# Patient Record
Sex: FEMALE | Race: WHITE | Employment: UNEMPLOYED
[De-identification: names, ages, dates, MRNs, and addresses within clinical notes are randomized per-mention and may not be internally consistent; named-entity substitution may affect disease eponyms.]

---

## 2017-02-24 ENCOUNTER — OFFICE VISIT (OUTPATIENT)
Dept: FAMILY MEDICINE CLINIC | Facility: CLINIC | Age: 57
End: 2017-02-24

## 2017-02-24 VITALS
SYSTOLIC BLOOD PRESSURE: 122 MMHG | RESPIRATION RATE: 20 BRPM | BODY MASS INDEX: 23.02 KG/M2 | HEART RATE: 73 BPM | WEIGHT: 160.8 LBS | TEMPERATURE: 98.1 F | OXYGEN SATURATION: 96 % | DIASTOLIC BLOOD PRESSURE: 74 MMHG

## 2017-02-24 DIAGNOSIS — I47.1 SVT (SUPRAVENTRICULAR TACHYCARDIA) (HCC): ICD-10-CM

## 2017-02-24 DIAGNOSIS — J01.40 ACUTE NON-RECURRENT PANSINUSITIS: Primary | ICD-10-CM

## 2017-02-24 DIAGNOSIS — K21.9 GASTROESOPHAGEAL REFLUX DISEASE WITHOUT ESOPHAGITIS: ICD-10-CM

## 2017-02-24 PROCEDURE — 3014F SCREEN MAMMO DOC REV: CPT | Performed by: NURSE PRACTITIONER

## 2017-02-24 PROCEDURE — G8484 FLU IMMUNIZE NO ADMIN: HCPCS | Performed by: NURSE PRACTITIONER

## 2017-02-24 PROCEDURE — G8420 CALC BMI NORM PARAMETERS: HCPCS | Performed by: NURSE PRACTITIONER

## 2017-02-24 PROCEDURE — 99214 OFFICE O/P EST MOD 30 MIN: CPT | Performed by: NURSE PRACTITIONER

## 2017-02-24 PROCEDURE — G8427 DOCREV CUR MEDS BY ELIG CLIN: HCPCS | Performed by: NURSE PRACTITIONER

## 2017-02-24 PROCEDURE — 3017F COLORECTAL CA SCREEN DOC REV: CPT | Performed by: NURSE PRACTITIONER

## 2017-02-24 PROCEDURE — 4004F PT TOBACCO SCREEN RCVD TLK: CPT | Performed by: NURSE PRACTITIONER

## 2017-02-24 RX ORDER — AMOXICILLIN 500 MG/1
500 CAPSULE ORAL 3 TIMES DAILY
Qty: 30 CAPSULE | Refills: 0 | Status: SHIPPED | OUTPATIENT
Start: 2017-02-24 | End: 2017-03-06

## 2017-02-24 RX ORDER — NAPROXEN 500 MG/1
TABLET ORAL
Qty: 90 TABLET | Refills: 1 | Status: SHIPPED | OUTPATIENT
Start: 2017-02-24 | End: 2017-04-26 | Stop reason: SDUPTHER

## 2017-02-24 RX ORDER — ESOMEPRAZOLE MAGNESIUM 40 MG/1
CAPSULE, DELAYED RELEASE ORAL
Qty: 90 CAPSULE | Refills: 1 | Status: SHIPPED | OUTPATIENT
Start: 2017-02-24 | End: 2017-10-20 | Stop reason: SDUPTHER

## 2017-03-04 ASSESSMENT — ENCOUNTER SYMPTOMS
NAUSEA: 0
COUGH: 0
SHORTNESS OF BREATH: 0
SORE THROAT: 0
EYE ITCHING: 0
ABDOMINAL DISTENTION: 0
BLOOD IN STOOL: 0
DIARRHEA: 0
EYE PAIN: 0
WATER BRASH: 1
CHEST TIGHTNESS: 0
CONSTIPATION: 0
SINUS PRESSURE: 1
COLOR CHANGE: 0
ABDOMINAL PAIN: 0
WHEEZING: 0
HEARTBURN: 1

## 2017-04-28 RX ORDER — NAPROXEN 500 MG/1
TABLET ORAL
Qty: 180 TABLET | Refills: 1 | Status: SHIPPED | OUTPATIENT
Start: 2017-04-28 | End: 2017-10-06 | Stop reason: SDUPTHER

## 2017-07-20 ENCOUNTER — OFFICE VISIT (OUTPATIENT)
Dept: FAMILY MEDICINE CLINIC | Age: 57
End: 2017-07-20
Payer: COMMERCIAL

## 2017-07-20 VITALS
DIASTOLIC BLOOD PRESSURE: 78 MMHG | SYSTOLIC BLOOD PRESSURE: 124 MMHG | HEART RATE: 71 BPM | OXYGEN SATURATION: 96 % | TEMPERATURE: 98 F | WEIGHT: 158 LBS | BODY MASS INDEX: 22.62 KG/M2 | RESPIRATION RATE: 16 BRPM

## 2017-07-20 DIAGNOSIS — I47.1 SUPRAVENTRICULAR TACHYCARDIA (HCC): ICD-10-CM

## 2017-07-20 DIAGNOSIS — D50.9 IRON DEFICIENCY ANEMIA, UNSPECIFIED IRON DEFICIENCY ANEMIA TYPE: Primary | ICD-10-CM

## 2017-07-20 PROCEDURE — G8420 CALC BMI NORM PARAMETERS: HCPCS | Performed by: NURSE PRACTITIONER

## 2017-07-20 PROCEDURE — 4004F PT TOBACCO SCREEN RCVD TLK: CPT | Performed by: NURSE PRACTITIONER

## 2017-07-20 PROCEDURE — 3017F COLORECTAL CA SCREEN DOC REV: CPT | Performed by: NURSE PRACTITIONER

## 2017-07-20 PROCEDURE — 99213 OFFICE O/P EST LOW 20 MIN: CPT | Performed by: NURSE PRACTITIONER

## 2017-07-20 PROCEDURE — G8427 DOCREV CUR MEDS BY ELIG CLIN: HCPCS | Performed by: NURSE PRACTITIONER

## 2017-07-20 PROCEDURE — 3014F SCREEN MAMMO DOC REV: CPT | Performed by: NURSE PRACTITIONER

## 2017-07-20 RX ORDER — LANOLIN ALCOHOL/MO/W.PET/CERES
325 CREAM (GRAM) TOPICAL 2 TIMES DAILY
Qty: 180 TABLET | Refills: 1 | Status: SHIPPED | OUTPATIENT
Start: 2017-07-20

## 2017-07-21 ENCOUNTER — TELEPHONE (OUTPATIENT)
Dept: FAMILY MEDICINE CLINIC | Age: 57
End: 2017-07-21

## 2017-07-29 ASSESSMENT — ENCOUNTER SYMPTOMS
CHEST TIGHTNESS: 0
BLOOD IN STOOL: 0
WHEEZING: 0
SORE THROAT: 0
COLOR CHANGE: 0
SINUS PRESSURE: 0
DIARRHEA: 0
EYE PAIN: 0
EYE ITCHING: 0
CONSTIPATION: 0
ABDOMINAL DISTENTION: 0

## 2017-10-20 ENCOUNTER — OFFICE VISIT (OUTPATIENT)
Dept: FAMILY MEDICINE CLINIC | Age: 57
End: 2017-10-20
Payer: COMMERCIAL

## 2017-10-20 VITALS
HEART RATE: 68 BPM | OXYGEN SATURATION: 96 % | WEIGHT: 164 LBS | RESPIRATION RATE: 18 BRPM | HEIGHT: 70 IN | SYSTOLIC BLOOD PRESSURE: 124 MMHG | BODY MASS INDEX: 23.48 KG/M2 | TEMPERATURE: 97.9 F | DIASTOLIC BLOOD PRESSURE: 78 MMHG

## 2017-10-20 DIAGNOSIS — K21.9 GASTROESOPHAGEAL REFLUX DISEASE WITHOUT ESOPHAGITIS: ICD-10-CM

## 2017-10-20 DIAGNOSIS — Z71.6 ENCOUNTER FOR SMOKING CESSATION COUNSELING: ICD-10-CM

## 2017-10-20 DIAGNOSIS — D50.9 IRON DEFICIENCY ANEMIA, UNSPECIFIED IRON DEFICIENCY ANEMIA TYPE: Primary | ICD-10-CM

## 2017-10-20 DIAGNOSIS — R00.2 HEART PALPITATIONS: ICD-10-CM

## 2017-10-20 PROCEDURE — 3014F SCREEN MAMMO DOC REV: CPT | Performed by: NURSE PRACTITIONER

## 2017-10-20 PROCEDURE — G8420 CALC BMI NORM PARAMETERS: HCPCS | Performed by: NURSE PRACTITIONER

## 2017-10-20 PROCEDURE — 3017F COLORECTAL CA SCREEN DOC REV: CPT | Performed by: NURSE PRACTITIONER

## 2017-10-20 PROCEDURE — G8484 FLU IMMUNIZE NO ADMIN: HCPCS | Performed by: NURSE PRACTITIONER

## 2017-10-20 PROCEDURE — 4004F PT TOBACCO SCREEN RCVD TLK: CPT | Performed by: NURSE PRACTITIONER

## 2017-10-20 PROCEDURE — G8427 DOCREV CUR MEDS BY ELIG CLIN: HCPCS | Performed by: NURSE PRACTITIONER

## 2017-10-20 PROCEDURE — 99214 OFFICE O/P EST MOD 30 MIN: CPT | Performed by: NURSE PRACTITIONER

## 2017-10-20 RX ORDER — VARENICLINE TARTRATE 25 MG
KIT ORAL
Qty: 21 TABLET | Refills: 0 | Status: SHIPPED | OUTPATIENT
Start: 2017-10-20 | End: 2018-07-13 | Stop reason: SDUPTHER

## 2017-10-20 RX ORDER — DILTIAZEM HYDROCHLORIDE 120 MG/1
120 CAPSULE, COATED, EXTENDED RELEASE ORAL DAILY
Qty: 90 CAPSULE | Refills: 1 | Status: SHIPPED | OUTPATIENT
Start: 2017-10-20 | End: 2018-04-14 | Stop reason: SDUPTHER

## 2017-10-20 RX ORDER — VARENICLINE TARTRATE 1 MG/1
1 TABLET, FILM COATED ORAL 2 TIMES DAILY
Qty: 60 TABLET | Refills: 3 | Status: SHIPPED | OUTPATIENT
Start: 2017-10-20 | End: 2018-07-13 | Stop reason: SDUPTHER

## 2017-10-20 RX ORDER — ESOMEPRAZOLE MAGNESIUM 40 MG/1
CAPSULE, DELAYED RELEASE ORAL
Qty: 90 CAPSULE | Refills: 1 | Status: SHIPPED | OUTPATIENT
Start: 2017-10-20 | End: 2018-04-14 | Stop reason: SDUPTHER

## 2017-10-22 ASSESSMENT — ENCOUNTER SYMPTOMS
WATER BRASH: 1
WHEEZING: 0
COLOR CHANGE: 0
ABDOMINAL DISTENTION: 0
VOMITING: 0
EYE PAIN: 0
CHEST TIGHTNESS: 0
HEARTBURN: 1
NAUSEA: 0
EYE ITCHING: 0
DIARRHEA: 0
SINUS PRESSURE: 0
BLOOD IN STOOL: 0
ABDOMINAL PAIN: 0
COUGH: 0
CONSTIPATION: 0
SHORTNESS OF BREATH: 1
SORE THROAT: 0

## 2017-10-22 NOTE — PROGRESS NOTES
[facility], [address] [address]  Dept: [phone]  Dept Fax: [phone]    Roxana Echols is a 62 y.o. female who presents today for her medical conditions/complaints as noted below. Roxana Echols is c/o of Anemia (lab results )    Scott Rajput received counseling on the following healthy behaviors: nutrition, exercise and medication adherence  Reviewed prior labs and health maintenance  Continue current medications, diet and exercise. Discussed use, benefit, and side effects of prescribed medications. Barriers to medication compliance addressed. Patient given educational materials - see patient instructions  Was a self-tracking handout given in paper form or via [employer]? Yes    Requested Prescriptions     Signed Prescriptions Disp Refills    metoprolol tartrate (LOPRESSOR) 25 MG tablet 90 tablet 1     Sig: Take 1 tablet by mouth daily    esomeprazole (NEXIUM) 40 MG delayed release capsule 90 capsule 1     Sig: Take 1 capsule by mouth  every morning before  breakfast    diltiazem (CARDIZEM CD) 120 MG extended release capsule 90 capsule 1     Sig: Take 1 capsule by mouth daily    varenicline (CHANTIX STARTING MONTH PAK) 0.5 MG X 11 & 1 MG X 42 tablet 21 tablet 0     Sig: Take by mouth.  varenicline (CHANTIX CONTINUING MONTH PAK) 1 MG tablet 60 tablet 3     Sig: Take 1 tablet by mouth 2 times daily       All patient questions answered. Patient voiced understanding. Quality Measures    Body mass index is 23.48 kg/m². Normal. Weight control planned discussed Healthy diet and regular exercise. BP: 124/78 Blood pressure is high. Treatment plan consists of No treatment change needed. Lab Results   Component Value Date    LDLCALC 149 04/29/2016    (goal LDL reduction with dx if diabetes is 50% LDL reduction)      No flowsheet data found.   Interpretation of Total Score Depression Severity: 1-4 = Minimal depression, 5-9 = Mild depression, 10-14 = No results found for: LABMICR  LDL Calculated (mg/dL)   Date Value   04/29/2016 149       (goal LDL is <100)   AST (U/L)   Date Value   04/29/2016 17     ALT (U/L)   Date Value   04/29/2016 18     BUN (mg/dL)   Date Value   04/29/2016 18     BP Readings from Last 3 Encounters:   10/20/17 124/78   07/20/17 124/78   02/24/17 122/74          (goal 120/80)    Past Medical History:   Diagnosis Date    MS (multiple sclerosis) ([facility] [address]       Past Surgical History:   Procedure Laterality Date    CHOLECYSTECTOMY      HYSTERECTOMY      TONSILLECTOMY         Family History   Problem Relation Age of Onset    Cancer Mother      cervical    Heart Disease Mother      chf    High Blood Pressure Father     Cancer Sister      cervical    Diabetes Sister      with obesity    Depression Sister        Social History   Substance Use Topics    Smoking status: Current Every Day Smoker     Packs/day: 1.00    Smokeless tobacco: Never Used    Alcohol use No      Comment: sips now and then      Current Outpatient Prescriptions   Medication Sig Dispense Refill    metoprolol tartrate (LOPRESSOR) 25 MG tablet Take 1 tablet by mouth daily 90 tablet 1    esomeprazole (NEXIUM) 40 MG delayed release capsule Take 1 capsule by mouth  every morning before  breakfast 90 capsule 1    diltiazem (CARDIZEM CD) 120 MG extended release capsule Take 1 capsule by mouth daily 90 capsule 1    varenicline (CHANTIX STARTING MONTH THANH) 0.5 MG X 11 & 1 MG X 42 tablet Take by mouth.  21 tablet 0    varenicline (CHANTIX CONTINUING MONTH THANH) 1 MG tablet Take 1 tablet by mouth 2 times daily 60 tablet 3    naproxen (NAPROSYN) 500 MG tablet TAKE 1 TABLET BY MOUTH TWO  TIMES DAILY 180 tablet 0    ferrous sulfate (FE TABS) 325 (65 Fe) MG EC tablet Take 1 tablet by mouth 2 times daily 180 tablet 1    gabapentin (NEURONTIN) 300 MG capsule Take 300 mg by mouth 3 times daily      HYDROcodone-acetaminophen (NORCO) 5-325 MG per tablet Take 1 tablet by mouth 2

## 2018-03-14 ENCOUNTER — OFFICE VISIT (OUTPATIENT)
Dept: FAMILY MEDICINE CLINIC | Age: 58
End: 2018-03-14
Payer: COMMERCIAL

## 2018-03-14 VITALS
WEIGHT: 181.4 LBS | DIASTOLIC BLOOD PRESSURE: 78 MMHG | SYSTOLIC BLOOD PRESSURE: 124 MMHG | RESPIRATION RATE: 18 BRPM | TEMPERATURE: 97.8 F | HEART RATE: 82 BPM | BODY MASS INDEX: 25.97 KG/M2 | OXYGEN SATURATION: 98 % | HEIGHT: 70 IN

## 2018-03-14 DIAGNOSIS — G35 MS (MULTIPLE SCLEROSIS) (HCC): ICD-10-CM

## 2018-03-14 DIAGNOSIS — M79.645 CHRONIC PAIN OF LEFT THUMB: Primary | ICD-10-CM

## 2018-03-14 DIAGNOSIS — E06.3 HASHIMOTO'S DISEASE: ICD-10-CM

## 2018-03-14 DIAGNOSIS — G89.29 CHRONIC PAIN OF LEFT THUMB: Primary | ICD-10-CM

## 2018-03-14 DIAGNOSIS — Z13.220 SCREENING FOR LIPOID DISORDERS: ICD-10-CM

## 2018-03-14 DIAGNOSIS — K21.9 GASTROESOPHAGEAL REFLUX DISEASE WITHOUT ESOPHAGITIS: ICD-10-CM

## 2018-03-14 PROCEDURE — 99214 OFFICE O/P EST MOD 30 MIN: CPT | Performed by: NURSE PRACTITIONER

## 2018-03-14 PROCEDURE — 3017F COLORECTAL CA SCREEN DOC REV: CPT | Performed by: NURSE PRACTITIONER

## 2018-03-14 PROCEDURE — 1036F TOBACCO NON-USER: CPT | Performed by: NURSE PRACTITIONER

## 2018-03-14 PROCEDURE — G8419 CALC BMI OUT NRM PARAM NOF/U: HCPCS | Performed by: NURSE PRACTITIONER

## 2018-03-14 PROCEDURE — G8484 FLU IMMUNIZE NO ADMIN: HCPCS | Performed by: NURSE PRACTITIONER

## 2018-03-14 PROCEDURE — G8427 DOCREV CUR MEDS BY ELIG CLIN: HCPCS | Performed by: NURSE PRACTITIONER

## 2018-03-14 PROCEDURE — 3014F SCREEN MAMMO DOC REV: CPT | Performed by: NURSE PRACTITIONER

## 2018-03-14 RX ORDER — NAPROXEN 500 MG/1
TABLET ORAL
Qty: 180 TABLET | Refills: 1 | Status: SHIPPED | OUTPATIENT
Start: 2018-03-14 | End: 2018-09-17 | Stop reason: SDUPTHER

## 2018-03-14 ASSESSMENT — PATIENT HEALTH QUESTIONNAIRE - PHQ9
1. LITTLE INTEREST OR PLEASURE IN DOING THINGS: 0
SUM OF ALL RESPONSES TO PHQ QUESTIONS 1-9: 0
SUM OF ALL RESPONSES TO PHQ9 QUESTIONS 1 & 2: 0
2. FEELING DOWN, DEPRESSED OR HOPELESS: 0

## 2018-03-25 ASSESSMENT — ENCOUNTER SYMPTOMS
EYE ITCHING: 0
COLOR CHANGE: 0
SORE THROAT: 0
NAUSEA: 0
CONSTIPATION: 0
SHORTNESS OF BREATH: 0
WHEEZING: 0
DIARRHEA: 0
SINUS PRESSURE: 0
ABDOMINAL PAIN: 0
COUGH: 0
ABDOMINAL DISTENTION: 0
CHEST TIGHTNESS: 0
EYE PAIN: 0
HEARTBURN: 1
BLOOD IN STOOL: 0

## 2018-03-25 NOTE — PROGRESS NOTES
from sleep. The heartburn does not limit her activity. The heartburn doesn't change with position. Nothing (if taking Nexium) aggravates the symptoms. Associated symptoms include fatigue and muscle weakness. Risk factors include NSAIDs. She has tried a PPI for the symptoms. The treatment provided significant relief. Past invasive treatments do not include gastroplasty. Hand Pain    The incident occurred more than 1 week ago. There was no injury mechanism. Pain location: left thumb. The quality of the pain is described as aching and shooting. The pain does not radiate. The pain is at a severity of 5/10. The pain is moderate. The pain has been intermittent since the incident. Pertinent negatives include no chest pain. The symptoms are aggravated by movement and lifting. She has tried NSAIDs, rest and ice for the symptoms. The treatment provided mild relief. Hashimoto's   This is a chronic problem that has been ongoing for several years. Has no symptoms at this time and takes no medication. No goiter noted  Multiple sclerosis  This is a chronic problem that has been ongoing for over 5 years. Is followed by [facility].    No results found for: LABA1C          ( goal A1C is < 7)   No results found for: LABMICR  LDL Calculated (mg/dL)   Date Value   04/29/2016 149       (goal LDL is <100)   AST (U/L)   Date Value   04/29/2016 17     ALT (U/L)   Date Value   04/29/2016 18     BUN (mg/dL)   Date Value   04/29/2016 18     BP Readings from Last 3 Encounters:   03/14/18 124/78   10/20/17 124/78   07/20/17 124/78          (goal 120/80)    Past Medical History:   Diagnosis Date    MS (multiple sclerosis) ([facility] [address]       Past Surgical History:   Procedure Laterality Date    CHOLECYSTECTOMY      HYSTERECTOMY      TONSILLECTOMY         Family History   Problem Relation Age of Onset    Cancer Mother      cervical    Heart Disease Mother      chf    High Blood Pressure Father     Cancer Sister      cervical    Diabetes

## 2018-07-13 ENCOUNTER — TELEPHONE (OUTPATIENT)
Dept: FAMILY MEDICINE CLINIC | Age: 58
End: 2018-07-13

## 2018-07-13 DIAGNOSIS — I10 ESSENTIAL HYPERTENSION: ICD-10-CM

## 2018-07-13 DIAGNOSIS — Z71.6 ENCOUNTER FOR SMOKING CESSATION COUNSELING: Primary | ICD-10-CM

## 2018-07-13 RX ORDER — VARENICLINE TARTRATE 25 MG
KIT ORAL
Qty: 53 TABLET | Refills: 0 | Status: SHIPPED | OUTPATIENT
Start: 2018-07-13 | End: 2018-09-17 | Stop reason: SDUPTHER

## 2018-07-13 RX ORDER — RAMIPRIL 10 MG/1
10 CAPSULE ORAL DAILY
Qty: 30 CAPSULE | Refills: 1 | Status: SHIPPED | OUTPATIENT
Start: 2018-07-13 | End: 2018-09-17 | Stop reason: SDUPTHER

## 2018-07-13 NOTE — TELEPHONE ENCOUNTER
Trae Lorenzo from [facility] called. She called in a refill on her Chantix, but it doesn't look like it has been filled since November. Should she start back out on the stater pack and then the continuing months. If so can you send a new script for starter pack. Will call patient and let her know what is going on when we hear back.

## 2018-09-17 ENCOUNTER — OFFICE VISIT (OUTPATIENT)
Dept: FAMILY MEDICINE CLINIC | Age: 58
End: 2018-09-17
Payer: COMMERCIAL

## 2018-09-17 VITALS
BODY MASS INDEX: 25.03 KG/M2 | RESPIRATION RATE: 18 BRPM | DIASTOLIC BLOOD PRESSURE: 72 MMHG | OXYGEN SATURATION: 99 % | WEIGHT: 169 LBS | HEART RATE: 58 BPM | SYSTOLIC BLOOD PRESSURE: 118 MMHG | HEIGHT: 69 IN

## 2018-09-17 DIAGNOSIS — Z71.6 ENCOUNTER FOR SMOKING CESSATION COUNSELING: ICD-10-CM

## 2018-09-17 DIAGNOSIS — K21.9 GASTROESOPHAGEAL REFLUX DISEASE WITHOUT ESOPHAGITIS: ICD-10-CM

## 2018-09-17 DIAGNOSIS — I10 ESSENTIAL HYPERTENSION: Primary | ICD-10-CM

## 2018-09-17 PROCEDURE — G8427 DOCREV CUR MEDS BY ELIG CLIN: HCPCS | Performed by: NURSE PRACTITIONER

## 2018-09-17 PROCEDURE — 1036F TOBACCO NON-USER: CPT | Performed by: NURSE PRACTITIONER

## 2018-09-17 PROCEDURE — 99214 OFFICE O/P EST MOD 30 MIN: CPT | Performed by: NURSE PRACTITIONER

## 2018-09-17 PROCEDURE — 3017F COLORECTAL CA SCREEN DOC REV: CPT | Performed by: NURSE PRACTITIONER

## 2018-09-17 PROCEDURE — G8420 CALC BMI NORM PARAMETERS: HCPCS | Performed by: NURSE PRACTITIONER

## 2018-09-17 RX ORDER — NAPROXEN 500 MG/1
TABLET ORAL
Qty: 180 TABLET | Refills: 2 | Status: SHIPPED | OUTPATIENT
Start: 2018-09-17

## 2018-09-17 RX ORDER — RAMIPRIL 10 MG/1
10 CAPSULE ORAL DAILY
Qty: 90 CAPSULE | Refills: 2 | Status: SHIPPED | OUTPATIENT
Start: 2018-09-17

## 2018-09-17 RX ORDER — ESOMEPRAZOLE MAGNESIUM 40 MG/1
CAPSULE, DELAYED RELEASE ORAL
Qty: 90 CAPSULE | Refills: 2 | Status: SHIPPED | OUTPATIENT
Start: 2018-09-17

## 2018-09-17 RX ORDER — VARENICLINE TARTRATE 25 MG
KIT ORAL
Qty: 53 TABLET | Refills: 0 | Status: SHIPPED | OUTPATIENT
Start: 2018-09-17

## 2018-09-17 RX ORDER — RAMIPRIL 10 MG/1
10 CAPSULE ORAL
COMMUNITY
End: 2018-09-17 | Stop reason: SDUPTHER

## 2018-09-17 NOTE — PROGRESS NOTES
Chronic Disease Visit Information    BP Readings from Last 3 Encounters:   03/14/18 124/78   10/20/17 124/78   07/20/17 124/78          LDL Calculated (mg/dL)   Date Value   04/29/2016 149     HDL (mg/dL)   Date Value   04/29/2016 65     BUN (mg/dL)   Date Value   04/29/2016 18     CREATININE (no units)   Date Value   04/29/2016 0.85     Glucose (mg/dL)   Date Value   04/29/2016 83            Have you changed or started any medications since your last visit including any over-the-counter medicines, vitamins, or herbal medicines? no   Are you having any side effects from any of your medications? -  no  Have you stopped taking any of your medications? Is so, why? -  yes - completed    Have you seen any other physician or provider since your last visit? Yes - Records Obtained  Have you had any other diagnostic tests since your last visit? Yes - Records Obtained  Have you been seen in the emergency room and/or had an admission to a hospital since we last saw you? Yes - Records Obtained  Have you had your annual diabetic retinal (eye) exam? No  Have you had your routine dental cleaning in the past 6 months? yes     Have you activated your [email or web address] account? If not, what are your barriers?  Yes     Patient Care Team:  LUIS Robbins - CNP as PCP - General (Nurse Practitioner)         Medical History Review  Past Medical, Family, and Social History reviewed and does contribute to the patient presenting condition    Health Maintenance   Topic Date Due    Hepatitis C screen  1960    HIV screen  06/21/1975    DTaP/Tdap/Td vaccine (1 - Tdap) 06/21/1979    Shingles Vaccine (1 of 2 - 2 Dose Series) 06/21/2010    Potassium monitoring  04/29/2017    Creatinine monitoring  04/29/2017    Flu vaccine (1) 09/01/2018    Colon cancer screen colonoscopy  10/22/2018 (Originally 6/21/2010)    Cervical cancer screen  12/07/2018    Breast cancer screen  01/11/2020    Lipid screen  04/29/2021

## 2018-09-18 NOTE — PROGRESS NOTES
[facility], [address] [place] [address]  Dept: [phone]  Dept Fax: [phone]    Iron Moctezuma is a 62 y.o. female who presents today for her medical conditions/complaints as noted below. Iron Moctezuma is c/o of Hypertension (6 month follow up); Eye Problem (onset about a month left eye); and Health Maintenance (will discuss with the provider,patient declines flu shot)    Jose Daughters received counseling on the following healthy behaviors: nutrition, exercise and medication adherence  Reviewed prior labs and health maintenance  Continue current medications, diet and exercise. Discussed use, benefit, and side effects of prescribed medications. Barriers to medication compliance addressed. Patient given educational materials - see patient instructions  Was a self-tracking handout given in paper form or via [employer]? Yes    Requested Prescriptions     Signed Prescriptions Disp Refills    varenicline (CHANTIX STARTING MONTH PAK) 0.5 MG X 11 & 1 MG X 42 tablet 53 tablet 0     Sig: Take by mouth.  ramipril (ALTACE) 10 MG capsule 90 capsule 2     Sig: Take 1 capsule by mouth daily    diclofenac sodium (VOLTAREN) 1 % GEL 3 Tube 2     Sig: Apply 4 g topically 4 times daily    esomeprazole (NEXIUM) 40 MG delayed release capsule 90 capsule 2     Sig: TAKE 1 CAPSULE BY MOUTH  EVERY MORNING BEFORE  BREAKFAST    metoprolol tartrate (LOPRESSOR) 25 MG tablet 90 tablet 2     Sig: Take 1 tablet by mouth daily    naproxen (NAPROSYN) 500 MG tablet 180 tablet 2     Sig: TAKE 1 TABLET BY MOUTH TWO  TIMES DAILY       All patient questions answered. Patient voiced understanding. Quality Measures    Body mass index is 24.96 kg/m². Normal. Weight control planned discussed Healthy diet and regular exercise. BP: 118/72 Blood pressure is normal. Treatment plan consists of No treatment change needed.     Lab Results   Component Value Date    LDLCALC 149 04/29/2016    (goal LDL reduction with dx if diabetes is 50% LDL reduction)      PHQ Scores 3/14/2018   PHQ2 Score 0   PHQ9 Score 0     Interpretation of Total Score Depression Severity: 1-4 = Minimal depression, 5-9 = Mild depression, 10-14 = Moderate depression, 15-19 = Moderately severe depression, 20-27 = Severe depression        HPI:     Hypertension   This is a chronic problem. The current episode started more than 1 year ago. The problem has been gradually improving since onset. The problem is controlled. Pertinent negatives include no chest pain, headaches, palpitations, peripheral edema or shortness of breath. Agents associated with hypertension include NSAIDs. Risk factors for coronary artery disease include post-menopausal state and smoking/tobacco exposure. Past treatments include ACE inhibitors and beta blockers. The current treatment provides moderate improvement. There are no compliance problems. There is no history of kidney disease, CAD/MI, CVA or heart failure. Gastroesophageal Reflux   She complains of heartburn and water brash. She reports no abdominal pain, no chest pain, no coughing, no nausea, no sore throat or no wheezing. This is a chronic problem. The current episode started more than 1 year ago. The problem occurs rarely (since starting Prilosec). The problem has been gradually improving. The heartburn duration is several minutes. The heartburn is located in the substernum. The heartburn is of mild intensity. The heartburn does not wake her from sleep. The heartburn does not limit her activity. The heartburn doesn't change with position. The symptoms are aggravated by lying down and certain foods. Risk factors include smoking/tobacco exposure and NSAIDs. She has tried a PPI for the symptoms. The treatment provided significant relief. Smoking cessation  This is a recurrent problem. States that she quit smoking once before but after 4 months, she started smoking again.  Says she would really like to quit but she does not have the willpower to do it cold turkey. Would like to take something that will help quit. No results found for: LABA1C          ( goal A1C is < 7)   No results found for: LABMICR  LDL Calculated (mg/dL)   Date Value   04/29/2016 149       (goal LDL is <100)   AST (U/L)   Date Value   04/29/2016 17     ALT (U/L)   Date Value   04/29/2016 18     BUN (mg/dL)   Date Value   04/29/2016 18     BP Readings from Last 3 Encounters:   09/17/18 118/72   03/14/18 124/78   10/20/17 124/78          (goal 120/80)    Past Medical History:   Diagnosis Date    MS (multiple sclerosis) ([facility] [address]       Past Surgical History:   Procedure Laterality Date    CHOLECYSTECTOMY      HYSTERECTOMY      TONSILLECTOMY         Family History   Problem Relation Age of Onset    Cancer Mother         cervical    Heart Disease Mother         chf    High Blood Pressure Father     Cancer Sister         cervical    Diabetes Sister         with obesity    Depression Sister        Social History   Substance Use Topics    Smoking status: Former Smoker     Packs/day: 1.00     Years: 40.00     Start date: 9/17/1978     Quit date: 11/23/2017    Smokeless tobacco: Never Used    Alcohol use No      Comment: sips now and then      Current Outpatient Prescriptions   Medication Sig Dispense Refill    varenicline (CHANTIX STARTING MONTH PAK) 0.5 MG X 11 & 1 MG X 42 tablet Take by mouth.  53 tablet 0    ramipril (ALTACE) 10 MG capsule Take 1 capsule by mouth daily 90 capsule 2    diclofenac sodium (VOLTAREN) 1 % GEL Apply 4 g topically 4 times daily 3 Tube 2    esomeprazole (NEXIUM) 40 MG delayed release capsule TAKE 1 CAPSULE BY MOUTH  EVERY MORNING BEFORE  BREAKFAST 90 capsule 2    metoprolol tartrate (LOPRESSOR) 25 MG tablet Take 1 tablet by mouth daily 90 tablet 2    naproxen (NAPROSYN) 500 MG tablet TAKE 1 TABLET BY MOUTH TWO  TIMES DAILY 180 tablet 2    ferrous sulfate (FE TABS) 325 (65 Fe) MG EC tablet Take 1 tablet by mouth 2 times daily 180 tablet 1    gabapentin (NEURONTIN) 300 MG capsule Take 300 mg by mouth 3 times daily      HYDROcodone-acetaminophen (NORCO) 5-325 MG per tablet Take 1 tablet by mouth 2 times daily      tiZANidine (ZANAFLEX) 4 MG tablet   Take 4 mg by mouth every 8 hours       Omega-3 Fatty Acids (FISH OIL) 1000 MG CAPS Take 3,000 mg by mouth 2 times daily      Fingolimod HCl (GILENYA) 0.5 MG CAPS Take 1 capsule by mouth daily       No current facility-administered medications for this visit. No Known Allergies    Health Maintenance   Topic Date Due    Hepatitis C screen  1960    HIV screen  06/21/1975    DTaP/Tdap/Td vaccine (1 - Tdap) 06/21/1979    Shingles Vaccine (1 of 2 - 2 Dose Series) 06/21/2010    Low dose CT lung screening  06/21/2015    Potassium monitoring  04/29/2017    Creatinine monitoring  04/29/2017    Colon cancer screen colonoscopy  10/22/2018 (Originally 6/21/2010)    Flu vaccine (1) 12/19/2018 (Originally 9/1/2018)    Cervical cancer screen  12/07/2018    Breast cancer screen  01/11/2020    Lipid screen  04/29/2021       Subjective:      Review of Systems   Constitutional: Negative for activity change and appetite change. HENT: Negative for congestion, ear pain, hearing loss, sinus pressure, sore throat and tinnitus. Eyes: Negative for pain, itching and visual disturbance. Respiratory: Negative for cough, chest tightness, shortness of breath and wheezing. Cardiovascular: Negative for chest pain, palpitations and leg swelling. Gastrointestinal: Positive for heartburn. Negative for abdominal distention, abdominal pain, blood in stool, constipation, diarrhea and nausea. Endocrine: Negative for cold intolerance, heat intolerance, polydipsia, polyphagia and polyuria. Genitourinary: Negative for dysuria, flank pain, frequency and urgency. Musculoskeletal: Negative for arthralgias, gait problem and myalgias. Skin: Negative for color change, rash and wound. reviewed. Assessment:      1. Essential hypertension    2. Encounter for smoking cessation counseling    3. Gastroesophageal reflux disease without esophagitis                     Plan:     Hypertension:  Take medications exactly as prescribed  Medications:  ramipril and metoprolol  Take one low dose ASA (81mg) every night at bedtime  Check blood pressure at home  Stay at healthy weight  Exercise at least 30 minutes 3 times a week  Avoid or limit alcohol  Limit salt intake  Eat plenty of fruit and vegetables  Lower amount saturated fat in diet  Gerd:  Wear loose fitting clothing without belts  Avoid heavy meals  Avoid lying down right after eating  Sleep with head elevated at least 4-6 inches. May put blocks under head of bed  Maintain proper weight  Take medication as prescribed  Avoid smoking and alcohol  Avoid overeating  Do not eat anything within 3 hours of lying down. Smoking cessation:  Take Chantix as directed  May want to chew gum or suck on suckers to keep mouth and hands busy. Orders Placed This Encounter   Procedures    CBC     Standing Status:   Future     Standing Expiration Date:   9/17/2019     Orders Placed This Encounter   Medications    varenicline (CHANTIX STARTING MONTH THANH) 0.5 MG X 11 & 1 MG X 42 tablet     Sig: Take by mouth.      Dispense:  53 tablet     Refill:  0    ramipril (ALTACE) 10 MG capsule     Sig: Take 1 capsule by mouth daily     Dispense:  90 capsule     Refill:  2    diclofenac sodium (VOLTAREN) 1 % GEL     Sig: Apply 4 g topically 4 times daily     Dispense:  3 Tube     Refill:  2    esomeprazole (NEXIUM) 40 MG delayed release capsule     Sig: TAKE 1 CAPSULE BY MOUTH  EVERY MORNING BEFORE  BREAKFAST     Dispense:  90 capsule     Refill:  2    metoprolol tartrate (LOPRESSOR) 25 MG tablet     Sig: Take 1 tablet by mouth daily     Dispense:  90 tablet     Refill:  2    naproxen (NAPROSYN) 500 MG tablet     Sig: TAKE 1 TABLET BY MOUTH TWO  TIMES DAILY     Dispense: 180 tablet     Refill:  2        Return When on back from [place], for hypertension, gerd. Patient given educational materials - see patient instructions. Discussed use, benefit, and side effects of prescribed medications. All patient questions answered. Pt voiced understanding. Reviewed health maintenance. Instructed to continue current medications, diet and exercise. Patient agreed with treatment plan. Follow up as directed.      Electronically signed by LUIS Fierro CNP on 9/18/2018

## 2018-09-24 ASSESSMENT — ENCOUNTER SYMPTOMS
CONSTIPATION: 0
HEARTBURN: 1
EYE ITCHING: 0
COLOR CHANGE: 0
BLOOD IN STOOL: 0
COUGH: 0
NAUSEA: 0
EYE PAIN: 0
DIARRHEA: 0
WATER BRASH: 1
ABDOMINAL PAIN: 0
WHEEZING: 0
SINUS PRESSURE: 0
SHORTNESS OF BREATH: 0
SORE THROAT: 0
CHEST TIGHTNESS: 0
ABDOMINAL DISTENTION: 0

## 2018-10-10 DIAGNOSIS — I10 ESSENTIAL HYPERTENSION: ICD-10-CM

## 2018-10-10 DIAGNOSIS — K21.9 GASTROESOPHAGEAL REFLUX DISEASE WITHOUT ESOPHAGITIS: ICD-10-CM

## 2018-10-10 DIAGNOSIS — G35 MS (MULTIPLE SCLEROSIS) (HCC): ICD-10-CM

## 2018-10-10 DIAGNOSIS — E06.3 HASHIMOTO'S DISEASE: ICD-10-CM

## 2018-10-10 DIAGNOSIS — Z13.220 SCREENING FOR LIPOID DISORDERS: ICD-10-CM

## 2018-10-10 LAB
ALBUMIN SERPL-MCNC: 3.9 G/DL
ALP BLD-CCNC: 86 U/L
ALT SERPL-CCNC: 14 U/L
ANION GAP SERPL CALCULATED.3IONS-SCNC: 8 MMOL/L
AST SERPL-CCNC: 14 U/L
BASOPHILS ABSOLUTE: NORMAL /ΜL
BASOPHILS RELATIVE PERCENT: NORMAL %
BILIRUB SERPL-MCNC: 0.4 MG/DL (ref 0.1–1.4)
BUN BLDV-MCNC: 18 MG/DL
CALCIUM SERPL-MCNC: 9.5 MG/DL
CHLORIDE BLD-SCNC: 107 MMOL/L
CHOLESTEROL, TOTAL: 209 MG/DL
CHOLESTEROL/HDL RATIO: 4.4
CO2: 27 MMOL/L
CREAT SERPL-MCNC: 0.86 MG/DL
EOSINOPHILS ABSOLUTE: NORMAL /ΜL
EOSINOPHILS RELATIVE PERCENT: NORMAL %
GFR CALCULATED: >60
GLUCOSE BLD-MCNC: 89 MG/DL
HCT VFR BLD CALC: 39.6 % (ref 36–46)
HDLC SERPL-MCNC: 47 MG/DL (ref 35–70)
HEMOGLOBIN: 13.2 G/DL (ref 12–16)
LDL CHOLESTEROL CALCULATED: 145 MG/DL (ref 0–160)
LYMPHOCYTES ABSOLUTE: NORMAL /ΜL
LYMPHOCYTES RELATIVE PERCENT: NORMAL %
MAGNESIUM: 1.8 MG/DL
MCH RBC QN AUTO: 30.4 PG
MCHC RBC AUTO-ENTMCNC: 33.4 G/DL
MCV RBC AUTO: 91 FL
MONOCYTES ABSOLUTE: NORMAL /ΜL
MONOCYTES RELATIVE PERCENT: NORMAL %
NEUTROPHILS ABSOLUTE: NORMAL /ΜL
NEUTROPHILS RELATIVE PERCENT: NORMAL %
PLATELET # BLD: 273 K/ΜL
PMV BLD AUTO: 8.1 FL
POTASSIUM SERPL-SCNC: 4.4 MMOL/L
RBC # BLD: 4.34 10^6/ΜL
SODIUM BLD-SCNC: 142 MMOL/L
TOTAL PROTEIN: 6.1
TRIGL SERPL-MCNC: 86 MG/DL
TSH SERPL DL<=0.05 MIU/L-ACNC: 2.22 UIU/ML
VLDLC SERPL CALC-MCNC: 17 MG/DL
WBC # BLD: 3.2 10^3/ML

## 2025-09-04 ENCOUNTER — TELEPHONE (OUTPATIENT)
Dept: GASTROENTEROLOGY | Age: 65
End: 2025-09-04